# Patient Record
Sex: FEMALE | Race: WHITE | URBAN - METROPOLITAN AREA
[De-identification: names, ages, dates, MRNs, and addresses within clinical notes are randomized per-mention and may not be internally consistent; named-entity substitution may affect disease eponyms.]

---

## 2017-10-04 ENCOUNTER — TRANSFERRED RECORDS (OUTPATIENT)
Dept: HEALTH INFORMATION MANAGEMENT | Facility: CLINIC | Age: 22
End: 2017-10-04

## 2017-10-11 ENCOUNTER — TRANSFERRED RECORDS (OUTPATIENT)
Dept: HEALTH INFORMATION MANAGEMENT | Facility: CLINIC | Age: 22
End: 2017-10-11

## 2017-10-25 NOTE — TELEPHONE ENCOUNTER
"APPT INFO    Date /Time:  11/16/17 9:15AM    Reason for Appt:  VESTIBULAR SCHWANNOMA    Ref Provider/Clinic:  self listed    Patient Contact (Y/N) & Call Details:  no, records received   Action: Reviewed records      RECORDS CLINIC NAME  (\"None\" if no records ) RECEIVED RECS & IMG? Y/N   (may include other helpful notes)   Internal Clinics:  none          External Clinics:  [facility]            Records Received From:   C     DATE/EXAM/LOCATION  (specify location if different)   Office Notes:  10/11/17   Radiology Reports: - MRI head 10/4/17  - MRA neck 7/25/17  - MRI brain 7/25/17    Images are in PACS        "

## 2017-11-09 DIAGNOSIS — D33.3 ACOUSTIC NEUROMA (H): Primary | ICD-10-CM

## 2017-11-16 ENCOUNTER — PRE VISIT (OUTPATIENT)
Dept: OTOLARYNGOLOGY | Facility: CLINIC | Age: 22
End: 2017-11-16

## 2017-11-16 ENCOUNTER — OFFICE VISIT (OUTPATIENT)
Dept: OTOLARYNGOLOGY | Facility: CLINIC | Age: 22
End: 2017-11-16

## 2017-11-16 ENCOUNTER — OFFICE VISIT (OUTPATIENT)
Dept: AUDIOLOGY | Facility: CLINIC | Age: 22
End: 2017-11-16
Attending: OTOLARYNGOLOGY

## 2017-11-16 VITALS — HEIGHT: 66 IN | WEIGHT: 135 LBS | BODY MASS INDEX: 21.69 KG/M2

## 2017-11-16 DIAGNOSIS — D33.3 ACOUSTIC NEUROMA (H): Primary | ICD-10-CM

## 2017-11-16 DIAGNOSIS — D33.3 ACOUSTIC NEUROMA (H): ICD-10-CM

## 2017-11-16 ASSESSMENT — PAIN SCALES - GENERAL: PAINLEVEL: NO PAIN (0)

## 2017-11-16 NOTE — MR AVS SNAPSHOT
After Visit Summary   11/16/2017    Naya Cortés    MRN: [id number]           Patient Information     Date Of Birth          1995        Visit Information        Provider Department      11/16/2017 8:30 AM Sara Bueno AuD [facility] Audiology        Today's Diagnoses     Acoustic neuroma (H)           Follow-ups after your visit        Your next 10 appointments already scheduled     Nov 16, 2017  9:00 AM CST   (Arrive by 8:45 AM)   NEW CRANIOFACIAL SKULL BASE with MD AHMET [facility] Ear Nose and Throat [facility])    [address] [place] [id number] [phone]            Nov 16, 2017  9:15 AM CST   (Arrive by 9:00 AM)   NEW CRANIOFACIAL SKULL BASE with MD AHMET [facility] Nose [facility] Throat [facility])    [address] [place] [id number] [phone]              Who to contact     Please call your clinic at [phone] to:    Ask questions about your health    Make or cancel appointments    Discuss your medicines    Learn about your test results    Speak to your doctor   If you have compliments or concerns about an experience at your clinic, or if you wish to file a complaint, please contact [facility] Patient Relations at [phone] or email us at [email or web address]         Additional Information About Your Visit        MyChart Information     CAXAt is an electronic gateway that provides easy, online access to your medical records. With [employer], you can request a clinic appointment, read your test results, renew a prescription or communicate with your care team.     To sign up for CAXAt visit the website at [email or web address]   You will be asked to enter the access code listed below, as well as some personal information. Please follow the directions to create your username and password.      Your access code is: [email or web address]  Expires: 2018  1:05 PM     Your access code will  in 90 days. If you need help or a new code, please contact your [facility] or call [phone] for assistance.        Care EveryWhere ID     This is your Care EveryWhere ID. This could be used by other organizations to access your [place] medical records  [id number]         Blood Pressure from Last 3 Encounters:   14 102/64   13 121/68    Weight from Last 3 Encounters:   14 59.1 kg (130 lb 3.2 oz) (53 %)*   13 58.1 kg (128 lb) (53 %)*     * Growth percentiles are based on [facility] 2-20 Years data.              We Performed the Following     AUDIOGRAM/TYMPANOGRAM - INTERFACE     AUDIOLOGY ADULT REFERRAL     [facility] ([address])     Tymps / Reflex   ([address])        Primary Care Provider Office Phone # Fax #    Nikki Matthews [phone]       [facility] [address]   [facility] [address]        Equal Access to Services     SACHA YIN AH: Hadii aad ku hadasho Soomaali, waaxda luqadaha, qaybta kaalmada adeegyada, waxay idiin hayaan adeeg kharacharleen la'shana ah. So [facility] [phone].    ATENCIÓN: Si habla español, tiene a chua disposición servicios gratuitos de asistencia lingüística. [facility] [place] [phone].    We comply with applicable federal civil rights laws and [place] laws. We do not discriminate on the basis of race, color, national origin, age, disability, sex, sexual orientation, or gender identity.            Thank you!     Thank you for choosing [facility] AUDIOLOGY  for your care. Our goal is always to provide you with excellent care. Hearing back from our patients is one way we can continue to improve our services. Please take a few minutes to complete the written survey that you may receive in the mail after your visit with us. Thank you!             Your Updated Medication List - Protect others  around you: Learn how to safely use, store and throw away your medicines at www.disposemymeds.org.          This list is accurate as of: 11/16/17  8:58 AM.  Always use your most recent med list.                   Brand Name Dispense Instructions for use Diagnosis    levonorgestrel-ethinyl estradiol 0.15-30 MG-MCG per tablet    SHAILESH    84 tablet    Take 1 tablet by mouth daily    Contraception management

## 2017-11-16 NOTE — PATIENT INSTRUCTIONS
It was a pleasure seeing you in the [facility] today.   You saw Dr. Erlinda Peterson and Dr. Hussain Avalos regarding your acoustic neuroma.  Plan:  Follow up in one year with an MRI and audio.      Please call our clinic for any questions,concerns,or worsening symptoms.      Clinic #[phone]       Option 3 for the triage nurse.       ENT Nurse Coordinator Cristina [phone]

## 2017-11-16 NOTE — MR AVS SNAPSHOT
After Visit Summary   11/16/2017    Naya Cortés    MRN: [id number]           Patient Information     Date Of Birth          1995        Visit Information        Provider Department      11/16/2017 9:00 AM Erlinda Peterson MD [facility] Nose and Throat        Today's Diagnoses     Acoustic neuroma (H)    -  1      Care Instructions    It was a pleasure seeing you in the [facility] today.   You saw Dr. Erlinda Peterson and Dr. Hussain Avalos regarding your acoustic neuroma.  Plan:  Follow up in one year with an MRI and audio.      Please call our clinic for any questions,concerns,or worsening symptoms.      Clinic #[phone]       Option 3 for the triage nurse.       ENT Nurse Coordinator Cristina [phone]            Follow-ups after your visit        Future tests that were ordered for you today     Open Future Orders        Priority Expected Expires Ordered    MRI Temporal Bone/IAC With and W/O Contrast Routine 11/1/2018 11/16/2018 11/16/2017            Who to contact     Please call your clinic at [phone] to:    Ask questions about your health    Make or cancel appointments    Discuss your medicines    Learn about your test results    Speak to your doctor   If you have compliments or concerns about an experience at your clinic, or if you wish to file a complaint, please contact [facility] Patient Relations at [phone] or email us at [email or web address]         Additional Information About Your Visit        MyChart Information     [employer] is an electronic gateway that provides easy, online access to your medical records. With [employer]!Tax, you can request a clinic appointment, read your test results, renew a prescription or communicate with your care team.     To sign up for [employer] visit the website at [email or web address]   You will be asked to enter the access code listed below, as well as some personal information. Please follow the  "directions to create your username and password.     Your access code is: [email or web address]  Expires: 2018  1:05 PM     Your access code will  in 90 days. If you need help or a new code, please contact your [facility] or call [phone] for assistance.        Care EveryWhere ID     This is your Care EveryWhere ID. This could be used by other organizations to access your [place] medical records  [id number]        Your Vitals Were     Height BMI (Body Mass Index)                1.676 m (5' 5.98\") 21.8 kg/m2           Blood Pressure from Last 3 Encounters:   14 102/64   13 121/68    Weight from Last 3 Encounters:   17 61.2 kg (135 lb)   14 59.1 kg (130 lb 3.2 oz) (53 %)*   13 58.1 kg (128 lb) (53 %)*     * Growth percentiles are based on [facility] 2-20 Years data.               Primary Care Provider Office Phone # Fax #    Nikki Matthews [phone]       [facility] [address]   [facility] [address]        Equal Access to Services     SACHA YIN AH: Hadii susana ku hadasho Soomaali, waaxda luqadaha, qaybta kaalmada adeegyada, forrest wright. So [facility] [phone].    ATENCIÓN: Si habla español, tiene a chua disposición servicios gratuitos de asistencia lingüística. Llame al [phone].    We comply with applicable federal civil rights laws and [place] laws. We do not discriminate on the basis of race, color, national origin, age, disability, sex, sexual orientation, or gender identity.            Thank you!     Thank you for choosing [facility] EAR NOSE AND THROAT  for your care. Our goal is always to provide you with excellent care. Hearing back from our patients is one way we can continue to improve our services. Please take a few minutes to complete the written survey that you may receive in the mail after your visit with us. Thank you!             Your Updated Medication List - Protect others " around you: Learn how to safely use, store and throw away your medicines at www.disposemymeds.org.          This list is accurate as of: 11/16/17  9:56 AM.  Always use your most recent med list.                   Brand Name Dispense Instructions for use Diagnosis    levonorgestrel-ethinyl estradiol 0.15-30 MG-MCG per tablet    SHAILESH    84 tablet    Take 1 tablet by mouth daily    Contraception management

## 2017-11-16 NOTE — PROGRESS NOTES
Naya Cortés is seen in consultation in the [facility] lateral skullbase clinic from Dr. Mott.  She is a 22 year old female being seen for discussion regarding a right vestibular schwannoma.  She was seen by myself and Dr. Avalos, our Neurosurgery colleague.  She was being evaluated for complex migraine shortly after she delivered her child and during that evaluation had a MRI with contrast which showed an enhancing mass in the right internal auditory canal.  She has had no hearing concerns, no vertigo or imbalance.      PMHx:  Migraine, ruptured ectopic pregnancy    PSHx:  Laparotomy for ruptured ectopic pregnancy    FHx:  No history of CNS tumors    Social History   Substance Use Topics     Smoking status: Never Smoker     Smokeless tobacco: Never Used     Alcohol use Yes      Comment: occasionally       Patient Supplied Answers to Review of Systems  [facility] ROS 11/16/2017   Eyes Visual loss   Musculoskeletal Neck pain   She was having headaches as well as a visual field cut.  The remainder of the 10 point review of systems is otherwise negative.    Physical examination:  Constitutional:  In no acute distress, appears stated age  Eyes:  Extraocular movements intact, no spontaneous nystagmus  Respiratory:  No increased work of breathing, wheezing or stridor  Musculoskeletal:  Good upper extremity strength  Skin:  No rashes on the head and neck  Neurologic:  House Brackman 1/6 bilaterally, ambulating normally  Psychiatric:  Alert, normal affect, answering questions appropriately    Audiogram:  Normal hearing bilaterally, 100% speech discrimination on the right, 96% on the left, normal tympanograms, intact reflexes throughout at 85dB.    Outside records:  Records and imaging were reviewed.  MRI with contrast from 10/4/17 shows an approximately 5mm enhancing mass within the fundus of the internal auditory canal, there is a CSF cap lateral to the tumor.  MRI from 7/25/17 was also reviewed and there is  a shadow seen on the T2 imaging within the lateral right internal auditory canal.  No change in size.    Assessment and plan:  Small right vestibular schwannoma.  We had a discussion with her regarding the diagnosis and reviewed the imaging with her and her .  Treatment options were discussed including observation, microsurgical resection via a middle fossa approach for possible hearing preservation and stereotactic radiosurgery.  We would not recommend radiosurgery for such a young patient.  We discussed middle fossa approach for hearing preservation given the small size of the tumor, excellent hearing and young age.  They understand that there is still a fairly high chance of losing all hearing with surgery and we also discussed the postoperative vertigo and disequilibrium.  We also discussed observation.  She has had 3 months of observation as she had 2 scans already with no obvious growth.  Given that she has a young infant, she and her  are comfortable with observation for now.

## 2017-11-16 NOTE — PROGRESS NOTES
AUDIOLOGY REPORT    SUMMARY: Audiology visit completed. See audiogram for results.      RECOMMENDATIONS: Follow-up with ENT.    Harry Kulkarni.  Licensed Audiologist  [place] #[id number]

## 2017-11-16 NOTE — NURSING NOTE
Chief Complaint   Patient presents with     Consult     vestibular Schwannoma       Donita Dueñas Medical Assistant

## 2017-11-16 NOTE — LETTER
11/16/2017       RE: Naya Cortés  [address] [facility] [address]     Dear Colleague,    Thank you for referring your patient, Naya Cortés, to the [facility] EAR NOSE AND THROAT at [facility]. Please see a copy of my visit note below.    Naya Cortés is seen in consultation in the [facility] lateral skullbase clinic from Dr. Mott.  She is a 22 year old female being seen for discussion regarding a right vestibular schwannoma.  She was seen by myself and Dr. Avalos, our Neurosurgery colleague.  She was being evaluated for complex migraine shortly after she delivered her child and during that evaluation had a MRI with contrast which showed an enhancing mass in the right internal auditory canal.  She has had no hearing concerns, no vertigo or imbalance.      PMHx:  Migraine, ruptured ectopic pregnancy    PSHx:  Laparotomy for ruptured ectopic pregnancy    FHx:  No history of CNS tumors    Social History   Substance Use Topics     Smoking status: Never Smoker     Smokeless tobacco: Never Used     Alcohol use Yes      Comment: occasionally       Patient Supplied Answers to Review of Systems  [facility] E[facility] ROS 11/16/2017   Eyes Visual loss   Musculoskeletal Neck pain   She was having headaches as well as a visual field cut.  The remainder of the 10 point review of systems is otherwise negative.    Physical examination:  Constitutional:  In no acute distress, appears stated age  Eyes:  Extraocular movements intact, no spontaneous nystagmus  Respiratory:  No increased work of breathing, wheezing or stridor  Musculoskeletal:  Good upper extremity strength  Skin:  No rashes on the head and neck  Neurologic:  House Brackman 1/6 bilaterally, ambulating normally  Psychiatric:  Alert, normal affect, answering questions appropriately    Audiogram:  Normal hearing bilaterally, 100% speech discrimination on the right, 96% on the left, normal tympanograms, intact reflexes  throughout at 85dB.    Outside records:  Records and imaging were reviewed.  MRI with contrast from 10/4/17 shows an approximately 5mm enhancing mass within the fundus of the internal auditory canal, there is a CSF cap lateral to the tumor.  MRI from 7/25/17 was also reviewed and there is a shadow seen on the T2 imaging within the lateral right internal auditory canal.  No change in size.    Assessment and plan:  Small right vestibular schwannoma.  We had a discussion with her regarding the diagnosis and reviewed the imaging with her and her .  Treatment options were discussed including observation, microsurgical resection via a middle fossa approach for possible hearing preservation and stereotactic radiosurgery.  We would not recommend radiosurgery for such a young patient.  We discussed middle fossa approach for hearing preservation given the small size of the tumor, excellent hearing and young age.  They understand that there is still a fairly high chance of losing all hearing with surgery and we also discussed the postoperative vertigo and disequilibrium.  We also discussed observation.  She has had 3 months of observation as she had 2 scans already with no obvious growth.  Given that she has a young infant, she and her  are comfortable with observation for now.      Again, thank you for allowing me to participate in the care of your patient.      Sincerely,    Erlinda Peterson MD